# Patient Record
Sex: MALE | Race: WHITE
[De-identification: names, ages, dates, MRNs, and addresses within clinical notes are randomized per-mention and may not be internally consistent; named-entity substitution may affect disease eponyms.]

---

## 2020-12-18 ENCOUNTER — HOSPITAL ENCOUNTER (EMERGENCY)
Dept: HOSPITAL 63 - ER | Age: 30
Discharge: HOME | End: 2020-12-18
Payer: OTHER GOVERNMENT

## 2020-12-18 VITALS — WEIGHT: 246.92 LBS | HEIGHT: 74 IN | BODY MASS INDEX: 31.69 KG/M2

## 2020-12-18 VITALS — DIASTOLIC BLOOD PRESSURE: 60 MMHG | SYSTOLIC BLOOD PRESSURE: 133 MMHG

## 2020-12-18 DIAGNOSIS — M62.82: Primary | ICD-10-CM

## 2020-12-18 LAB
ALBUMIN SERPL-MCNC: 4.1 G/DL (ref 3.4–5)
ALBUMIN/GLOB SERPL: 1.2 {RATIO} (ref 1–1.7)
ALP SERPL-CCNC: 71 U/L (ref 46–116)
ALT SERPL-CCNC: 122 U/L (ref 16–63)
ANION GAP SERPL CALC-SCNC: 9 MMOL/L (ref 6–14)
APTT PPP: YELLOW S
AST SERPL-CCNC: 217 U/L (ref 15–37)
BACTERIA #/AREA URNS HPF: 0 /HPF
BASOPHILS # BLD AUTO: 0.1 X10^3/UL (ref 0–0.2)
BASOPHILS NFR BLD: 1 % (ref 0–3)
BILIRUB SERPL-MCNC: 0.4 MG/DL (ref 0.2–1)
BILIRUB UR QL STRIP: (no result)
BUN/CREAT SERPL: 10 (ref 6–20)
CA-I SERPL ISE-MCNC: 11 MG/DL (ref 8–26)
CALCIUM SERPL-MCNC: 9.3 MG/DL (ref 8.5–10.1)
CHLORIDE SERPL-SCNC: 104 MMOL/L (ref 98–107)
CO2 SERPL-SCNC: 29 MMOL/L (ref 21–32)
CREAT SERPL-MCNC: 1.1 MG/DL (ref 0.7–1.3)
EOSINOPHIL NFR BLD: 0.2 X10^3/UL (ref 0–0.7)
EOSINOPHIL NFR BLD: 4 % (ref 0–3)
ERYTHROCYTE [DISTWIDTH] IN BLOOD BY AUTOMATED COUNT: 13.3 % (ref 11.5–14.5)
FIBRINOGEN PPP-MCNC: CLEAR MG/DL
GFR SERPLBLD BASED ON 1.73 SQ M-ARVRAT: 78.6 ML/MIN
GLOBULIN SER-MCNC: 3.3 G/DL (ref 2.2–3.8)
GLUCOSE SERPL-MCNC: 79 MG/DL (ref 70–99)
GLUCOSE UR STRIP-MCNC: (no result) MG/DL
HCT VFR BLD CALC: 44.3 % (ref 39–53)
HGB BLD-MCNC: 15 G/DL (ref 13–17.5)
LYMPHOCYTES # BLD: 1.8 X10^3/UL (ref 1–4.8)
LYMPHOCYTES NFR BLD AUTO: 31 % (ref 24–48)
MCH RBC QN AUTO: 30 PG (ref 25–35)
MCHC RBC AUTO-ENTMCNC: 34 G/DL (ref 31–37)
MCV RBC AUTO: 87 FL (ref 79–100)
MONO #: 0.4 X10^3/UL (ref 0–1.1)
MONOCYTES NFR BLD: 7 % (ref 0–9)
NEUT #: 3.3 X10^3UL (ref 1.8–7.7)
NEUTROPHILS NFR BLD AUTO: 57 % (ref 31–73)
NITRITE UR QL STRIP: (no result)
PLATELET # BLD AUTO: 207 X10^3/UL (ref 140–400)
POTASSIUM SERPL-SCNC: 3.9 MMOL/L (ref 3.5–5.1)
PROT SERPL-MCNC: 7.4 G/DL (ref 6.4–8.2)
RBC # BLD AUTO: 5.07 X10^6/UL (ref 4.3–5.7)
RBC #/AREA URNS HPF: 0 /HPF (ref 0–2)
SODIUM SERPL-SCNC: 142 MMOL/L (ref 136–145)
SP GR UR STRIP: 1.01
SQUAMOUS #/AREA URNS LPF: (no result) /LPF
UROBILINOGEN UR-MCNC: 0.2 MG/DL
WBC # BLD AUTO: 5.8 X10^3/UL (ref 4–11)
WBC #/AREA URNS HPF: 0 /HPF (ref 0–4)

## 2020-12-18 PROCEDURE — 99283 EMERGENCY DEPT VISIT LOW MDM: CPT

## 2020-12-18 PROCEDURE — 36415 COLL VENOUS BLD VENIPUNCTURE: CPT

## 2020-12-18 PROCEDURE — 81001 URINALYSIS AUTO W/SCOPE: CPT

## 2020-12-18 PROCEDURE — 96360 HYDRATION IV INFUSION INIT: CPT

## 2020-12-18 PROCEDURE — 80053 COMPREHEN METABOLIC PANEL: CPT

## 2020-12-18 PROCEDURE — 85025 COMPLETE CBC W/AUTO DIFF WBC: CPT

## 2020-12-18 PROCEDURE — 82550 ASSAY OF CK (CPK): CPT

## 2020-12-18 NOTE — PHYS DOC
Past History


Past Medical History:  No Pertinent History


Drug Use:  None





General Adult


EDM:


Chief Complaint:  MYALGIAS





HPI:


HPI:





Patient is a 3-year-old male who arrives with chief complaint of myalgias and 

elevated creatinine kinase.  Patient had a workout on December 14 and noted some

stiffness beginning that evening which was worse on the 15th and had to the 16th

but is since improving.  Patient describes diffuse muscle aches is worse in the 

bilateral calfs and somewhat in his upper back and neck.  Patient denies any 

fever, chills, cough, shortness of breath.  Patient denies any nausea vomiting 

or diarrhea.  Patient denies any paresthesias.  Symptoms are worse with 

outpatient activity and better with rest.  Patient had some outpatient blood 

work drawn on the 16th that showed a CK level in the 7000s and again yesterday 

which showed a CK level in the 10,000 range





Review of Systems:


Review of Systems:


Constitutional:  Denies fever or chills 


Eyes:  Denies change in visual acuity 


HENT:  Denies nasal congestion or sore throat 


Respiratory:  Denies cough or shortness of breath 


Cardiovascular:  Denies chest pain or edema 


GI:  Denies abdominal pain, nausea, vomiting, bloody stools or diarrhea 


: Denies dysuria 


Musculoskeletal: Complains of myalgias, most prominent in bilateral calfs  


Integument:  Denies rash 


Neurologic:  Denies headache, focal weakness or sensory changes 


Endocrine:  Denies polyuria or polydipsia 


Lymphatic:  Denies swollen glands 


Psychiatric:  Denies depression or anxiety





Current Medications:


Current Meds:





Current Medications








 Medications


  (Trade)  Dose


 Ordered  Sig/María  Start Time


 Stop Time Status Last Admin


Dose Admin


 


 Sodium Chloride  1,000 ml @ 


 1,000 mls/hr  1X  ONCE  12/18/20 10:45


 12/18/20 11:44 UNV  














Physical Exam:


PE:





Constitutional: Well developed, well nourished, no acute distress, non-toxic 

appearance. []


HENT: Normocephalic, atraumatic, bilateral external ears normal, no trismus nose

 normal. []


Eyes: PERRLA, EOMI, conjunctiva normal, no discharge. [] 


Neck: Normal range of motion, no tenderness, supple, no stridor. [] 


Cardiovascular:Heart rate regular rhythm, peripheral pulses are intact cap 

refill is brisk


Lungs & Thorax:  Bilateral breath sounds clear, no respiratory distress


Abdomen: soft, no tenderness, no masses, no pulsatile masses. [] 


Skin: Warm, dry, no erythema, no rash. [] 


Back: No tenderness, no CVA tenderness. [] 


Extremities: Mild bilateral calf tenderness, compartments are soft, no cyanosis,

 no clubbing, ROM intact, no edema. [] 


Neurologic: Alert and oriented X 3, normal motor function, normal sensory 

function, no focal deficits noted. []


Psychologic: Affect normal, judgement normal, mood normal. []





Current Patient Data:


Labs:





Laboratory Tests








Test


 12/18/20


10:50 12/18/20


11:14


 


White Blood Count 5.8 x10^3/uL  


 


Red Blood Count 5.07 x10^6/uL  


 


Hemoglobin 15.0 g/dL  


 


Hematocrit 44.3 %  


 


Mean Corpuscular Volume 87 fL  


 


Mean Corpuscular Hemoglobin 30 pg  


 


Mean Corpuscular Hemoglobin


Concent 34 g/dL 


 





 


Red Cell Distribution Width 13.3 %  


 


Platelet Count 207 x10^3/uL  


 


Neutrophils (%) (Auto) 57 %  


 


Lymphocytes (%) (Auto) 31 %  


 


Monocytes (%) (Auto) 7 %  


 


Eosinophils (%) (Auto) 4 %  


 


Basophils (%) (Auto) 1 %  


 


Neutrophils # (Auto) 3.3 x10^3uL  


 


Lymphocytes # (Auto) 1.8 x10^3/uL  


 


Monocytes # (Auto) 0.4 x10^3/uL  


 


Eosinophils # (Auto) 0.2 x10^3/uL  


 


Basophils # (Auto) 0.1 x10^3/uL  


 


Sodium Level 142 mmol/L  


 


Potassium Level 3.9 mmol/L  


 


Chloride Level 104 mmol/L  


 


Carbon Dioxide Level 29 mmol/L  


 


Anion Gap 9  


 


Blood Urea Nitrogen 11 mg/dL  


 


Creatinine 1.1 mg/dL  


 


Estimated GFR


(Cockcroft-Gault) 78.6 


 





 


BUN/Creatinine Ratio 10  


 


Glucose Level 79 mg/dL  


 


Calcium Level 9.3 mg/dL  


 


Total Bilirubin 0.4 mg/dL  


 


Aspartate Amino Transf


(AST/SGOT) 217 U/L 


 





 


Alanine Aminotransferase


(ALT/SGPT) 122 U/L 


 





 


Alkaline Phosphatase 71 U/L  


 


Creatine Kinase 6817 U/L  


 


Total Protein 7.4 g/dL  


 


Albumin 4.1 g/dL  


 


Albumin/Globulin Ratio 1.2  


 


Urine Collection Type  Unknown 


 


Urine Color  Yellow 


 


Urine Clarity  Clear 


 


Urine pH  7.0 


 


Urine Specific Gravity  1.010 


 


Urine Protein  Neg 


 


Urine Glucose (UA)  Neg mg/dL 


 


Urine Ketones (Stick)  Neg mg/dL 


 


Urine Blood  Neg 


 


Urine Nitrite  Neg 


 


Urine Bilirubin  Neg 


 


Urine Urobilinogen Dipstick  0.2 mg/dL 


 


Urine Leukocyte Esterase  Neg 


 


Urine RBC  0 /HPF 


 


Urine WBC  0 /HPF 


 


Urine Squamous Epithelial


Cells 


 Occ /LPF 





 


Urine Bacteria  0 /HPF 








Current Medications








 Medications


  (Trade)  Dose


 Ordered  Sig/María


 Route


 PRN Reason  Start Time


 Stop Time Status Last Admin


Dose Admin


 


 Sodium Chloride  1,000 ml @ 


 1,000 mls/hr  1X  ONCE


 IV


   12/18/20 10:45


 12/18/20 11:44 DC 12/18/20 10:45





 


 Sodium Chloride  1,000 ml @ 


 1,000 mls/hr  1X  ONCE


 IV


   12/18/20 11:00


 12/18/20 11:59 DC 12/18/20 11:00











Vital Signs:





Vital Signs








  Date Time  Temp Pulse Resp B/P (MAP) Pulse Ox O2 Delivery O2 Flow Rate FiO2


 


12/18/20 12:12  65 14 133/60 (84) 98 Room Air  


 


12/18/20 10:55 98.7       











EKG:


EKG:


[]





Radiology/Procedures:


Radiology/Procedures:


[]





Heart Score:


Risk Factors:


Risk Factors:  DM, Current or recent (<one month) smoker, HTN, HLP, family 

history of CAD, obesity.


Risk Scores:


Score 0 - 3:  2.5% MACE over next 6 weeks - Discharge Home


Score 4 - 6:  20.3% MACE over next 6 weeks - Admit for Clinical Observation


Score 7 - 10:  72.7% MACE over next 6 weeks - Early Invasive Strategies





Course & Med Decision Making:


Course & Med Decision Making


Pertinent Labs and Imaging studies reviewed. (See chart for details)





[] 30-year-old male presents with myalgias due to rhabdomyolysis.  Patient CK 

has gone down from 10,000 TO 6,700 today.  Patient given 2 L of fluids in the ER

 and feels better.  Patient's BUN and creatinine are normal.  Patient will need 

short-term follow-up with his primary care physician early next week.  I 

discussed the case with the patient's primary care provider who will follow up 

next week.





Dragon Disclaimer:


Dragon Disclaimer:


This electronic medical record was generated, in whole or in part, using a voice

 recognition dictation system.





Departure


Departure:


Impression:  


   Primary Impression:  


   Rhabdomyolysis


   Additional Impression:  


   Myalgia


Disposition:  01 DC HOME SELF CARE/HOMELESS


Condition:  STABLE


Referrals:  


PCP,UNKNOWN (PCP)








[facility]


Early next week


Patient Instructions:  Rhabdomyolysis





Additional Instructions:  


EMERGENCY DEPARTMENT GENERAL DISCHARGE INSTRUCTIONS





THANK YOU for coming to [facility] Emergency Department (ED) today and 


trusting us with your care.  We trust that you had a positive experience in our 

Emergency 


Department. If you wish to speak to the department Management you can contact 

the  emergency department 


 at [phone]








YOUR FOLLOW UP INSTRUCTIONS ARE AS FOLLOWS: 





Do you have a private doctor? If you do not have a private doctor, please ask 

for a resource 


list of physicians or clinics that may be able to assist you with follow up 

care.  





The Emergency Physician has interpreted your x-rays. The X-ray specialist will 

also review 


them. If there is a change in the findings you will be notified in 48 hours when

 at all 


possible. 





A lab test or lab culture may have been done, your results will be reviewed and 

you will be 


notified if you need a change in treatment. 








ADDITIONAL INSTRUCTIONS AND INFORMATION 





Your care today has been supervised by a physician who is specially trained in 

emergency 


care. Many problems require more than one evaluation for a complete diagnosis 

and treatment. 


We recommend that you schedule your follow up appointment as recommended to 

ensure complete 


treatment of your illness or injury.  If you are unable to obtain follow up care

 and 


continue to have a problem, or if your condition worsens we recommend that you 

return to the 


ED. 





We are not able to safely determine your condition over the phone nor are we 

able to give 


sound medical advice over the phone. For these safety reasons, if you call for 

medical 


advice we will ask you to come to the ED for further evaluation





If you have any questions regarding these discharge instructions please call the

 ED at  [phone]





SAFETY INFORMATION





In the interest of safety, wellness, and injury prevention; we encourage you to 

wear your 


seatbelt, if you smoke; quit smoking, and we encourage your family to use 

protective helmet 


for bicycling and other sporting events that present an increased risk for head 

injury. 





IF YOUR SYMPTOMS WORSEN OR NEW SYMPTOMS DEVELOP, OR YOU HAVE CONCERNS ABOUT YOUR

 CONDITION; 


OR IF YOUR CONDITION WORSENS WHILE YOU ARE WAITING FOR YOUR FOLLOW UP AP

POINTMENT;  EITHER  


CONTACT YOUR PRIMARY CARE DOCTOR, THE PHYSICIAN WHOSE NAME AND NUMBER YOU WERE 

GIVEN,  OR 


RETURN TO THE  ED IMMEDIATELY.











AIDA MCDONALD MD              Dec 18, 2020 10:50

## 2021-09-27 ENCOUNTER — HOSPITAL ENCOUNTER (EMERGENCY)
Dept: HOSPITAL 63 - ER | Age: 31
Discharge: HOME | End: 2021-09-27
Payer: OTHER GOVERNMENT

## 2021-09-27 VITALS — HEIGHT: 73 IN | BODY MASS INDEX: 29.8 KG/M2 | WEIGHT: 224.87 LBS

## 2021-09-27 VITALS — SYSTOLIC BLOOD PRESSURE: 106 MMHG | DIASTOLIC BLOOD PRESSURE: 47 MMHG
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
## 2021-09-27 DIAGNOSIS — S93.402A: Primary | ICD-10-CM
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
## 2021-09-27 PROCEDURE — 73610 X-RAY EXAM OF ANKLE: CPT

## 2021-09-27 PROCEDURE — 99283 EMERGENCY DEPT VISIT LOW MDM: CPT

## 2021-09-27 NOTE — RAD
EXAM:  XR EXAM OF ANKLE_LEFT 3V 9/27/2021 8:25 AM



CLINICAL INDICATION:  Twisted ankle running



COMPARISON:  None



TECHNIQUE:  3 views of the left ankle



FINDINGS:  No acute fracture. Alignment is normal. Ankle mortise is symmetric and talar dome is intac
t. There is moderate lateral soft tissue swelling.



IMPRESSION:  No acute osseous abnormality. Moderate lateral soft tissue swelling.



Electronically signed by: Judy Salas MD (9/27/2021 8:41 AM) [id number]

## 2021-09-27 NOTE — PHYS DOC
Past History


Past Medical History:  No Pertinent History


Past Surgical History:  Other


Additional Past Surgical Histo:  ear x6


Alcohol Use:  Occasionally


Drug Use:  None





General Adult


EDM:


Chief Complaint:  ANKLE PROBLEM





HPI:


HPI:


31-year-old male presents with left ankle pain.  He is a soldier at the local 

 [employer].  He was working on the gym this morning when he stepped wrong and

rolled ankle.  He fell to the ground but was able to recover in about 10 

minutes.  Did not feel overly weak so he decided to go and run a mile and a half

with his shoulders.  Afterwards it hurt a lot more and the patient fell and 

rolled the ankle a second time.  After he went home he discovered it was very 

swollen and it is more difficult to walk at this time.  He presents to make sure

is not broken.





Review of Systems:


Review of Systems:


Constitutional:  Denies fever or chills 


Eyes:  Denies change in visual acuity 


HENT:  Denies nasal congestion or sore throat 


Respiratory:  Denies cough or shortness of breath 


Cardiovascular:  Denies chest pain or edema 


GI:  Denies abdominal pain, nausea, vomiting, bloody stools or diarrhea 


: Denies dysuria 


Musculoskeletal: Left lateral ankle pain


Integument:  Denies rash 


Neurologic:  Denies headache, focal weakness or sensory changes 


Endocrine:  Denies polyuria or polydipsia 


Lymphatic:  Denies swollen glands 


Psychiatric:  Denies depression or anxiety





Allergies:


Allergies:





Allergies








Coded Allergies Type Severity Reaction Last Updated Verified


 


  No Known Drug Allergies    9/27/21 No











Physical Exam:


PE:





Constitutional: Well developed, well nourished, no acute distress, non-toxic 

appearance. []


HENT: Normocephalic, atraumatic, bilateral external ears normal, oropharynx 

moist, no oral exudates, nose normal. []


Eyes: PERRLA, EOMI, conjunctiva normal, no discharge. [] 


Neck: Normal range of motion, no tenderness, supple, no stridor. [] 


Cardiovascular:Heart rate regular rhythm, no murmur []


Lungs & Thorax:  Bilateral breath sounds clear to auscultation []


Abdomen: Bowel sounds normal, soft, no tenderness, no masses, no pulsatile 

masses. [] 


Skin: Warm, dry, no erythema, no rash. [] 


Back: No tenderness, no CVA tenderness. [] 


Extremities: Swelling of the left lateral ankle, tenderness to palpation, no 

ecchymosis.  [] 


Neurologic: Alert and oriented X 3, normal motor function, normal sensory funct

ion, no focal deficits noted. []


Psychologic: Affect normal, judgement normal, mood normal. []





Current Patient Data:


Vital Signs:





                                   Vital Signs








  Date Time  Temp Pulse Resp B/P (MAP) Pulse Ox O2 Delivery O2 Flow Rate FiO2


 


9/27/21 08:05 97.7 83 18 109/46 (67) 98 Room Air  











EKG:


EKG:


[]





Radiology/Procedures:


Radiology/Procedures:


[]


Impressions:


EXAM:  XR EXAM OF ANKLE_LEFT 3V 9/27/2021 8:25 AM





CLINICAL INDICATION:  Twisted ankle running





COMPARISON:  None





TECHNIQUE:  3 views of the left ankle





FINDINGS:  No acute fracture. Alignment is normal. Ankle mortise is symmetric 

and talar dome is intact. There is moderate lateral soft tissue swelling.





IMPRESSION:  No acute osseous abnormality. Moderate lateral soft tissue 

swelling.





Electronically signed by: Judy Salas MD (9/27/2021 8:41 AM) [id number]














DICTATED AND SIGNED BY:     JUDY SALAS MD


DATE:     09/27/21 0840





CC: SHERINE HOLM DO; CHUCK PETIT ~MTH0 0





Heart Score:


C/O Chest Pain:  N/A


Risk Factors:


Risk Factors:  DM, Current or recent (<one month) smoker, HTN, HLP, family 

history of CAD, obesity.


Risk Scores:


Score 0 - 3:  2.5% MACE over next 6 weeks - Discharge Home


Score 4 - 6:  20.3% MACE over next 6 weeks - Admit for Clinical Observation


Score 7 - 10:  72.7% MACE over next 6 weeks - Early Invasive Strategies





Course & Med Decision Making:


Course & Med Decision Making


Pertinent Labs and Imaging studies reviewed. (See chart for details)





[]





Dragon Disclaimer:


Dragon Disclaimer:


This electronic medical record was generated, in whole or in part, using a voice

 recognition dictation system.





Departure


Departure:


Impression:  


   Primary Impression:  


   Moderate left ankle sprain


   Qualified Codes:  S93.402A - Sprain of unspecified ligament of left ankle, 

   initial encounter


Disposition:  01 HOME / SELF CARE / HOMELESS


Condition:  STABLE


Referrals:  


CHUCK PETIT (PCP)


Patient Instructions:  Ankle Sprain, Acute, with Phase I Rehab-Sports[facility]











SHERINE HOLM DO                 Sep 27, 2021 08:47

## 2023-09-14 ENCOUNTER — HOSPITAL ENCOUNTER (OUTPATIENT)
Dept: HOSPITAL 53 - M PLAIMG | Age: 33
End: 2023-09-14
Attending: OTOLARYNGOLOGY
Payer: COMMERCIAL

## 2023-09-14 DIAGNOSIS — H71.91: Primary | ICD-10-CM
